# Patient Record
(demographics unavailable — no encounter records)

---

## 2025-07-09 NOTE — CARDIOLOGY SUMMARY
[de-identified] : 7/9/2025 Sinus Bradycardia at 54 bpm.  Normal axis.   -Nonspecific QRS widening/IVCD. No prior ECG for comparison.  ABNORMAL

## 2025-07-09 NOTE — CARDIOLOGY SUMMARY
[de-identified] : 7/9/2025 Sinus Bradycardia at 54 bpm.  Normal axis.   -Nonspecific QRS widening/IVCD. No prior ECG for comparison.  ABNORMAL

## 2025-07-09 NOTE — HISTORY OF PRESENT ILLNESS
[FreeTextEntry1] :   Mr. DARLIN MATA is a pleasant 69 year old man without significant past medical history who presents for evaluation of risk of ASCVD.  He feels well in general.  Patient denies any chest pain, shortness of breath, palpitations, dizziness or syncope.  Denies lower extremity edema, orthopnea or paroxysmal nocturnal dyspnea.  Admits to snoring, daytime somnolence or morning fatigue.  He reports excellent exercise capacity, able to run 2 miles outdoors at least 3 times a week, able to climb several flights of stairs.  He had not seen a physician in many years.

## 2025-07-16 NOTE — HISTORY OF PRESENT ILLNESS
[FreeTextEntry1] :   Mr. DARLIN MATA is a 70-year-old man without significant past medical history who presents for follow-up of ASCVD risk, abnormal ECG and elevated BP reading.  He continues to feel well.   Patient denies any chest pain, shortness of breath, palpitations, dizziness or syncope.  Denies lower extremity edema, orthopnea or paroxysmal nocturnal dyspnea.  Admits to snoring, daytime somnolence or morning fatigue.  He reports excellent exercise capacity, able to run 2 miles outdoors at least 3 times a week, able to climb several flights of stairs.